# Patient Record
Sex: MALE | Race: BLACK OR AFRICAN AMERICAN | NOT HISPANIC OR LATINO | Employment: FULL TIME | URBAN - METROPOLITAN AREA
[De-identification: names, ages, dates, MRNs, and addresses within clinical notes are randomized per-mention and may not be internally consistent; named-entity substitution may affect disease eponyms.]

---

## 2023-08-14 ENCOUNTER — OFFICE VISIT (OUTPATIENT)
Dept: INTERNAL MEDICINE | Facility: CLINIC | Age: 28
End: 2023-08-14
Payer: COMMERCIAL

## 2023-08-14 VITALS
TEMPERATURE: 97.8 F | DIASTOLIC BLOOD PRESSURE: 64 MMHG | WEIGHT: 185 LBS | SYSTOLIC BLOOD PRESSURE: 118 MMHG | BODY MASS INDEX: 28.04 KG/M2 | OXYGEN SATURATION: 97 % | HEART RATE: 66 BPM | HEIGHT: 68 IN

## 2023-08-14 DIAGNOSIS — M25.562 ACUTE PAIN OF LEFT KNEE: ICD-10-CM

## 2023-08-14 DIAGNOSIS — Z76.89 ENCOUNTER TO ESTABLISH CARE: Primary | ICD-10-CM

## 2023-08-14 DIAGNOSIS — Z87.891 FORMER SMOKER: ICD-10-CM

## 2023-08-14 DIAGNOSIS — J45.909 CHILDHOOD ASTHMA, UNSPECIFIED ASTHMA SEVERITY, UNSPECIFIED WHETHER COMPLICATED, UNSPECIFIED WHETHER PERSISTENT: ICD-10-CM

## 2023-08-14 DIAGNOSIS — L72.9 CYST OF SKIN: ICD-10-CM

## 2023-08-21 ENCOUNTER — HOSPITAL ENCOUNTER (OUTPATIENT)
Dept: GENERAL RADIOLOGY | Facility: HOSPITAL | Age: 28
Discharge: HOME | End: 2023-08-21
Admitting: NURSE PRACTITIONER
Payer: COMMERCIAL

## 2023-08-21 DIAGNOSIS — M25.562 ACUTE PAIN OF LEFT KNEE: ICD-10-CM

## 2023-08-21 PROCEDURE — 73562 X-RAY EXAM OF KNEE 3: CPT

## 2023-08-24 ENCOUNTER — TELEPHONE (OUTPATIENT)
Dept: INTERNAL MEDICINE | Facility: CLINIC | Age: 28
End: 2023-08-24
Payer: COMMERCIAL

## 2023-08-25 DIAGNOSIS — M25.562 ACUTE PAIN OF LEFT KNEE: Primary | ICD-10-CM

## 2023-09-17 ENCOUNTER — HOSPITAL ENCOUNTER (OUTPATIENT)
Dept: MRI IMAGING | Facility: HOSPITAL | Age: 28
Discharge: HOME | End: 2023-09-17
Admitting: NURSE PRACTITIONER
Payer: COMMERCIAL

## 2023-09-17 DIAGNOSIS — M25.562 ACUTE PAIN OF LEFT KNEE: ICD-10-CM

## 2023-09-17 PROCEDURE — 73721 MRI JNT OF LWR EXTRE W/O DYE: CPT

## 2023-09-28 ENCOUNTER — TELEPHONE (OUTPATIENT)
Dept: INTERNAL MEDICINE | Facility: CLINIC | Age: 28
End: 2023-09-28
Payer: COMMERCIAL

## 2025-02-07 ENCOUNTER — OFFICE VISIT (OUTPATIENT)
Dept: INTERNAL MEDICINE | Facility: CLINIC | Age: 30
End: 2025-02-07
Payer: COMMERCIAL

## 2025-02-07 VITALS
DIASTOLIC BLOOD PRESSURE: 74 MMHG | BODY MASS INDEX: 27.58 KG/M2 | OXYGEN SATURATION: 98 % | SYSTOLIC BLOOD PRESSURE: 118 MMHG | WEIGHT: 182 LBS | HEIGHT: 68 IN | HEART RATE: 71 BPM

## 2025-02-07 DIAGNOSIS — M67.431 GANGLION CYST OF WRIST, RIGHT: Primary | ICD-10-CM

## 2025-02-07 PROCEDURE — 99213 OFFICE O/P EST LOW 20 MIN: CPT | Performed by: NURSE PRACTITIONER

## 2025-02-07 NOTE — PROGRESS NOTES
Office Note     Name: Zhang Torres II    : 1995     MRN: [id number]     Chief Complaint  Mass (Patient reports today for a lump on his right hand. Patient states he noticed this around 2024. Patient states that it is slightly painful when he has to move his hand a certain way. Patient states he is unable to move it around. )    Subjective     History of Present Illness:  Zhang Torres II is a 29 y.o. male who presents today for evaluation of a lump to his right hand.    Patient reports he noticed the lump initially in 2024.    He does note intermittent pain and discomfort when moving his hand a certain way.  He reports the size of the lump tends to fluctuate.  He notes intermittent pain and discomfort while at work.  It is localized to his right hand/wrist area.  He does work at [employer] and does repetitive motion with [employer].    He was concerned since the nodule has persisted for 6 months.  He presents today for evaluation of the above complaint.    No further complaints or concerns at this time.  Pleasant visit with the patient today.    History reviewed. No pertinent past medical history.    History reviewed. No pertinent surgical history.    Social History     Socioeconomic History    Marital status:    Tobacco Use    Smoking status: Former     Current packs/day: 0.00     Average packs/day: 2.0 packs/day for 4.6 years (9.2 ttl pk-yrs)     Types: Cigarettes, Electronic Cigarette     Start date: 2018     Quit date: 2023     Years since quittin.9    Smokeless tobacco: Never   Vaping Use    Vaping status: Never Used   Substance and Sexual Activity    Alcohol use: Yes     Alcohol/week: 3.0 standard drinks of alcohol     Types: 3 Glasses of wine per week    Drug use: Never    Sexual activity: Yes     Partners: Female     Birth control/protection: Injection       No current outpatient medications on file.    Objective  "    Vital Signs  /74   Pulse 71   Ht 172.7 cm (68\")   Wt 82.6 kg (182 lb)   SpO2 98%   BMI 27.67 kg/m²   Estimated body mass index is 27.67 kg/m² as calculated from the following:    Height as of this encounter: 172.7 cm (68\").    Weight as of this encounter: 82.6 kg (182 lb).    BMI is >= 25 and <30. (Overweight) The following options were offered after discussion;: Not addressed at this visit      Physical Exam  Constitutional:       Appearance: Normal appearance.   HENT:      Head: Normocephalic and atraumatic.      Nose: Nose normal.   Eyes:      Extraocular Movements: Extraocular movements intact.      Conjunctiva/sclera: Conjunctivae normal.      Pupils: Pupils are equal, round, and reactive to light.   Cardiovascular:      Rate and Rhythm: Normal rate.   Pulmonary:      Effort: Pulmonary effort is normal. No respiratory distress.   Musculoskeletal:         General: Normal range of motion.      Cervical back: Normal range of motion and neck supple.      Comments: Ganglion cyst noted to right hand/wrist area, no redness noted, mild tenderness to palpation, range of motion within normal limits.   Skin:     General: Skin is warm and dry.   Neurological:      General: No focal deficit present.      Mental Status: He is alert and oriented to person, place, and time. Mental status is at baseline.   Psychiatric:         Mood and Affect: Mood normal.         Behavior: Behavior normal.         Thought Content: Thought content normal.         Judgment: Judgment normal.          Assessment and Plan     Diagnoses and all orders for this visit:    1. Ganglion cyst of wrist, right (Primary)    Offered patient a referral to orthopedics and he respectfully declined today.  He will continue to monitor his symptoms.  He is agreeable to get a compression sleeve and start wearing that to work.  He will also try over-the-counter Tylenol and ibuprofen to assist with pain and discomfort.  May use ice as well if " needed.  Patient will notify me if symptoms persist or fail to improve.    Follow Up  Return if symptoms worsen or fail to improve, for Next scheduled follow up.    ARNOL Lazo    Part of this note may be an electronic transcription/translation of spoken language to printed text using the Dragon Dictation System.

## 2025-03-24 ENCOUNTER — OFFICE VISIT (OUTPATIENT)
Dept: INTERNAL MEDICINE | Facility: CLINIC | Age: 30
End: 2025-03-24
Payer: COMMERCIAL

## 2025-03-24 VITALS
SYSTOLIC BLOOD PRESSURE: 118 MMHG | BODY MASS INDEX: 28.13 KG/M2 | WEIGHT: 185.6 LBS | HEIGHT: 68 IN | HEART RATE: 65 BPM | TEMPERATURE: 98.2 F | OXYGEN SATURATION: 98 % | DIASTOLIC BLOOD PRESSURE: 74 MMHG

## 2025-03-24 DIAGNOSIS — R10.9 ACUTE RIGHT FLANK PAIN: ICD-10-CM

## 2025-03-24 DIAGNOSIS — N20.0 KIDNEY STONE ON RIGHT SIDE: Primary | ICD-10-CM

## 2025-03-24 RX ORDER — IBUPROFEN 800 MG/1
800 TABLET, FILM COATED ORAL 3 TIMES DAILY PRN
COMMUNITY
Start: 2025-03-21

## 2025-03-24 RX ORDER — OXYCODONE AND ACETAMINOPHEN 5; 325 MG/1; MG/1
1 TABLET ORAL
COMMUNITY
Start: 2025-03-21

## 2025-03-24 RX ORDER — ONDANSETRON 4 MG/1
4 TABLET, ORALLY DISINTEGRATING ORAL EVERY 8 HOURS
COMMUNITY
Start: 2025-03-21

## 2025-03-24 NOTE — LETTER
March 24, 2025     Patient: Zhang Torres II   YOB: 1995   Date of Visit: 3/24/2025       To Whom It May Concern:    Zhang Torres was seen in the office and evaluated by me today after a recent ED visit. It is my medical opinion that Zhang Torres may return to work tentatively on Monday 3/31/25.           Sincerely,        ARNOL Lazo

## 2025-03-24 NOTE — PROGRESS NOTES
Office Note     Name: Zhang Torres II    : 1995     MRN: [id number]     Chief Complaint  Hospital Follow Up Visit (Patient reports today for a hospital follow up. Patient states that he has kidney stones. Patient states he has not passed any and when they did the cat scan they only seen one. Patient states he has been vomiting due to the pain and he rates this pain at a 9 today. )    Subjective     History of Present Illness:  Zhang Torres II is a 29 y.o. male who presents today for follow up after recent ED visit.     Patient reported to [facility] ED on 3/21/25 with c/o sudden onset abdominal pain. Scans revealed a 3 mm partially obstructing calcification in the distal right ureter. Patient was discharged home with PRN medications: oxycodone-acetaminophen 5mg/325mg, ibuprofen 800mg, and ondansetron 4mg.     Patient states pain was better the day after discharge for a few hours but has since been severe and constant. Pain is localized to right lower flank that wraps around to right lower abdomen. Pt denies passing of any calculi to this point. Patient has nausea with vomiting. Last vomited this morning - clear, yellow emesis. Is able to eat and drink. Has increased water intake. Patient last took oxycodone/acetaminophen at 6:00pm last night and ibuprofen at 11:00pm last night. He has not taken zofran. Patient states he does not like taking medications. Pt denies blood in urine, burning on urination, or frequency.     Patient was not referred to urology by ED. Pt requesting work excuse today.       History reviewed. No pertinent past medical history.    History reviewed. No pertinent surgical history.    Social History     Socioeconomic History    Marital status:    Tobacco Use    Smoking status: Former     Current packs/day: 0.00     Average packs/day: 2.0 packs/day for 4.6 years (9.2 ttl pk-yrs)     Types: Cigarettes, Electronic Cigarette     Start date: 2018     Quit  "date: 2023     Years since quittin.1    Smokeless tobacco: Never   Vaping Use    Vaping status: Never Used   Substance and Sexual Activity    Alcohol use: Yes     Alcohol/week: 3.0 standard drinks of alcohol     Types: 3 Glasses of wine per week    Drug use: Never    Sexual activity: Yes     Partners: Female     Birth control/protection: Injection         Current Outpatient Medications:     ibuprofen (ADVIL,MOTRIN) 800 MG tablet, Take 1 tablet by mouth 3 (Three) Times a Day As Needed. for pain, Disp: , Rfl:     ondansetron ODT (ZOFRAN-ODT) 4 MG disintegrating tablet, Take 1 tablet by mouth Every 8 (Eight) Hours., Disp: , Rfl:     oxyCODONE-acetaminophen (PERCOCET) 5-325 MG per tablet, Take 1 tablet by mouth every 6 (six) to 8 (eight) hours as needed for Pain., Disp: , Rfl:     Objective     Vital Signs  /74   Pulse 65   Temp 98.2 °F (36.8 °C)   Ht 172.7 cm (68\")   Wt 84.2 kg (185 lb 9.6 oz)   SpO2 98%   BMI 28.22 kg/m²   Estimated body mass index is 28.22 kg/m² as calculated from the following:    Height as of this encounter: 172.7 cm (68\").    Weight as of this encounter: 84.2 kg (185 lb 9.6 oz).           Physical Exam  Vitals and nursing note reviewed.   Constitutional:       General: He is in acute distress.      Appearance: Normal appearance. He is not toxic-appearing.   Cardiovascular:      Rate and Rhythm: Normal rate and regular rhythm.      Heart sounds: Normal heart sounds.   Pulmonary:      Effort: Pulmonary effort is normal.      Breath sounds: Normal breath sounds.   Abdominal:      General: Abdomen is flat. Bowel sounds are normal. There is no distension.      Palpations: Abdomen is soft.      Tenderness: There is abdominal tenderness in the right lower quadrant. There is right CVA tenderness. There is no left CVA tenderness.   Skin:     General: Skin is warm and dry.   Neurological:      Mental Status: He is alert and oriented to person, place, and time.   Psychiatric:         " Mood and Affect: Mood and affect normal.         Behavior: Behavior is cooperative.          Assessment and Plan     Diagnoses and all orders for this visit:    1. Kidney stone on right side (Primary)  -     Ambulatory Referral to Urology    2. Acute right flank pain    Plan:  Take prescribed pain medication and antiemetic as prescribed for pain and nausea control.  Encouraged increased water intake.   Will refer to urology in case of complications.   Work excuse given for remainder of the week.   Patient provided with strainer and specimen cup to collect stone in the event he is able to pass.    Follow Up  Return if symptoms worsen or fail to improve, for Next scheduled follow up.    ARNOL Lazo    Agree with assessment, documentation, and plan of care provided by ARNOL Bob student.    Part of this note may be an electronic transcription/translation of spoken language to printed text using the Dragon Dictation System.

## 2025-04-03 ENCOUNTER — TELEPHONE (OUTPATIENT)
Dept: INTERNAL MEDICINE | Facility: CLINIC | Age: 30
End: 2025-04-03

## 2025-04-03 NOTE — TELEPHONE ENCOUNTER
Caller: Zhang Torres II    Relationship: Self    Best call back number: [phone]     What form or medical record are you requesting: [facility] PAPERWORK    Who is requesting this form or medical record from you: WORK    How would you like to receive the form or medical records (pick-up, mail, fax):         Timeframe paperwork needed: AS SOON AS POSSIBLE    Additional notes: PATIENT IS CALLING TO CHECK THE STATUS OF THE [facility] PAPERWORK THAT WAS FAXED TO US ON 03/24/25.